# Patient Record
Sex: FEMALE | Race: WHITE
[De-identification: names, ages, dates, MRNs, and addresses within clinical notes are randomized per-mention and may not be internally consistent; named-entity substitution may affect disease eponyms.]

---

## 2018-04-30 ENCOUNTER — HOSPITAL ENCOUNTER (EMERGENCY)
Dept: HOSPITAL 63 - ER | Age: 23
Discharge: HOME | End: 2018-04-30
Payer: OTHER GOVERNMENT

## 2018-04-30 VITALS — HEIGHT: 62 IN | BODY MASS INDEX: 18.03 KG/M2 | WEIGHT: 98 LBS

## 2018-04-30 VITALS — DIASTOLIC BLOOD PRESSURE: 74 MMHG | SYSTOLIC BLOOD PRESSURE: 120 MMHG

## 2018-04-30 DIAGNOSIS — K52.9: Primary | ICD-10-CM

## 2018-04-30 LAB
ALBUMIN SERPL-MCNC: 3.7 G/DL (ref 3.4–5)
ALBUMIN/GLOB SERPL: 1.1 {RATIO} (ref 1–1.7)
ALP SERPL-CCNC: 50 U/L (ref 46–116)
ALT SERPL-CCNC: 19 U/L (ref 14–59)
ANION GAP SERPL CALC-SCNC: 10 MMOL/L (ref 6–14)
APTT PPP: YELLOW S
AST SERPL-CCNC: 17 U/L (ref 15–37)
BACTERIA #/AREA URNS HPF: 0 /HPF
BASOPHILS # BLD AUTO: 0 X10^3/UL (ref 0–0.2)
BASOPHILS NFR BLD: 1 % (ref 0–3)
BILIRUB SERPL-MCNC: 0.5 MG/DL (ref 0.2–1)
BILIRUB UR QL STRIP: (no result)
BUN/CREAT SERPL: 20 (ref 6–20)
CA-I SERPL ISE-MCNC: 14 MG/DL (ref 7–20)
CALCIUM SERPL-MCNC: 8.7 MG/DL (ref 8.5–10.1)
CHLORIDE SERPL-SCNC: 105 MMOL/L (ref 98–107)
CO2 SERPL-SCNC: 25 MMOL/L (ref 21–32)
CREAT SERPL-MCNC: 0.7 MG/DL (ref 0.6–1)
EOSINOPHIL NFR BLD: 0 X10^3/UL (ref 0–0.7)
EOSINOPHIL NFR BLD: 1 % (ref 0–3)
ERYTHROCYTE [DISTWIDTH] IN BLOOD BY AUTOMATED COUNT: 13.1 % (ref 11.5–14.5)
FIBRINOGEN PPP-MCNC: CLEAR MG/DL
GFR SERPLBLD BASED ON 1.73 SQ M-ARVRAT: 105.6 ML/MIN
GLOBULIN SER-MCNC: 3.5 G/DL (ref 2.2–3.8)
GLUCOSE SERPL-MCNC: 89 MG/DL (ref 70–99)
GLUCOSE UR STRIP-MCNC: (no result) MG/DL
HCT VFR BLD CALC: 40.5 % (ref 36–47)
HGB BLD-MCNC: 13.8 G/DL (ref 12–15.5)
LIPASE: 83 U/L (ref 73–393)
LYMPHOCYTES # BLD: 1.5 X10^3/UL (ref 1–4.8)
LYMPHOCYTES NFR BLD AUTO: 20 % (ref 24–48)
MAGNESIUM SERPL-MCNC: 2.2 MG/DL (ref 1.8–2.4)
MCH RBC QN AUTO: 31 PG (ref 25–35)
MCHC RBC AUTO-ENTMCNC: 34 G/DL (ref 31–37)
MCV RBC AUTO: 91 FL (ref 79–100)
MONO #: 0.6 X10^3/UL (ref 0–1.1)
MONOCYTES NFR BLD: 9 % (ref 0–9)
NEUT #: 5.4 X10^3UL (ref 1.8–7.7)
NEUTROPHILS NFR BLD AUTO: 71 % (ref 31–73)
NITRITE UR QL STRIP: (no result)
PLATELET # BLD AUTO: 241 X10^3/UL (ref 140–400)
POTASSIUM SERPL-SCNC: 3.9 MMOL/L (ref 3.5–5.1)
PROT SERPL-MCNC: 7.2 G/DL (ref 6.4–8.2)
RBC # BLD AUTO: 4.44 X10^6/UL (ref 3.5–5.4)
RBC #/AREA URNS HPF: 0 /HPF (ref 0–2)
SODIUM SERPL-SCNC: 140 MMOL/L (ref 136–145)
SP GR UR STRIP: >=1.03
SQUAMOUS #/AREA URNS LPF: (no result) /LPF
UROBILINOGEN UR-MCNC: 0.2 MG/DL
WBC # BLD AUTO: 7.6 X10^3/UL (ref 4–11)
WBC #/AREA URNS HPF: 0 /HPF (ref 0–4)

## 2018-04-30 PROCEDURE — 83735 ASSAY OF MAGNESIUM: CPT

## 2018-04-30 PROCEDURE — 99284 EMERGENCY DEPT VISIT MOD MDM: CPT

## 2018-04-30 PROCEDURE — 85025 COMPLETE CBC W/AUTO DIFF WBC: CPT

## 2018-04-30 PROCEDURE — 80053 COMPREHEN METABOLIC PANEL: CPT

## 2018-04-30 PROCEDURE — 36415 COLL VENOUS BLD VENIPUNCTURE: CPT

## 2018-04-30 PROCEDURE — 81001 URINALYSIS AUTO W/SCOPE: CPT

## 2018-04-30 PROCEDURE — 83690 ASSAY OF LIPASE: CPT

## 2018-04-30 NOTE — PHYS DOC
Adult General


Chief Complaint


Chief Complaint:  ABDOMINAL PAIN





HPI


HPI





Patient is a 22 year old F who presents with upper abdominal pain since she at 

[employer] last night.  She also describes associated nausea and diarrhea.  She 

is currently in the process of being worked up for GI issues. She states that 

she has infrequent bowel movements and a sensation of fullness in her abdomen. 

She did have tests pending for H. pylori. She has no other symptoms at this 

time. She has no other exacerbating or alleviating factors.





Review of Systems


Review of Systems





Constitutional: Denies fever or chills []


Eyes: Denies change in visual acuity, redness, or eye pain []


HENT: Denies nasal congestion or sore throat []


Respiratory: Denies cough or shortness of breath []


Cardiovascular: No additional information not addressed in HPI []


GI: Negative except history of present illness


: Denies dysuria or hematuria []


Musculoskeletal: Denies back pain or joint pain []


Integument: Denies rash or skin lesions []


Neurologic: Denies headache, focal weakness or sensory changes []


Endocrine: Denies polyuria or polydipsia []





All other systems were reviewed and found to be within normal limits, except as 

documented in this note.





Family History


Family History


No pertinent family medical history was reported





Current Medications


Current Medications


Current medications reviewed





Allergies


Allergies


No known allergies





Physical Exam


Physical Exam





Constitutional: Well developed, well nourished, no acute distress, non-toxic 

appearance. []


HENT: Normocephalic, atraumatic


Eyes: EOMI, conjunctiva normal, no discharge. [] 


Neck: Normal range of motion, no tenderness, supple, no stridor. [] 


Cardiovascular:Heart rate regular rhythm, no murmur []


Lungs & Thorax:  Bilateral breath sounds clear to auscultation []


Abdomen: Bowel sounds normal, soft, no masses, no pulsatile masses. [] Mild 

epigastric tenderness to palpation


Skin: Warm, dry, no erythema, no rash. [] 


Extremities: No tenderness, no cyanosis, no clubbing, ROM intact, no edema. [] 


Neurologic: Alert and oriented X 3, normal motor function, normal sensory 

function, no focal deficits noted. []


Psychologic: Affect normal, judgement normal, mood normal. []





Current Patient Data


Vital Signs





Vital Signs








  Date Time  Temp Pulse Resp B/P (MAP) Pulse Ox O2 Delivery O2 Flow Rate FiO2


 


4/30/18 13:23  94 16 109/71 (84) 97 Room Air  


 


4/30/18 12:45 98.3 99 20  100 Room Air  








Lab Results





Laboratory Tests








Test


  4/30/18


13:52 4/30/18


14:03


 


White Blood Count


  7.6 x10^3/uL


(4.0-11.0) 


 


 


Red Blood Count


  4.44 x10^6/uL


(3.50-5.40) 


 


 


Hemoglobin


  13.8 g/dL


(12.0-15.5) 


 


 


Hematocrit


  40.5 %


(36.0-47.0) 


 


 


Mean Corpuscular Volume 91 fL ()  


 


Mean Corpuscular Hemoglobin 31 pg (25-35)  


 


Mean Corpuscular Hemoglobin


Concent 34 g/dL


(31-37) 


 


 


Red Cell Distribution Width


  13.1 %


(11.5-14.5) 


 


 


Platelet Count


  241 x10^3/uL


(140-400) 


 


 


Neutrophils (%) (Auto) 71 % (31-73)  


 


Lymphocytes (%) (Auto) 20 % (24-48)  


 


Monocytes (%) (Auto) 9 % (0-9)  


 


Eosinophils (%) (Auto) 1 % (0-3)  


 


Basophils (%) (Auto) 1 % (0-3)  


 


Neutrophils # (Auto)


  5.4 x10^3uL


(1.8-7.7) 


 


 


Lymphocytes # (Auto)


  1.5 x10^3/uL


(1.0-4.8) 


 


 


Monocytes # (Auto)


  0.6 x10^3/uL


(0.0-1.1) 


 


 


Eosinophils # (Auto)


  0.0 x10^3/uL


(0.0-0.7) 


 


 


Basophils # (Auto)


  0.0 x10^3/uL


(0.0-0.2) 


 


 


Sodium Level


  140 mmol/L


(136-145) 


 


 


Potassium Level


  3.9 mmol/L


(3.5-5.1) 


 


 


Chloride Level


  105 mmol/L


() 


 


 


Carbon Dioxide Level


  25 mmol/L


(21-32) 


 


 


Anion Gap 10 (6-14)  


 


Blood Urea Nitrogen


  14 mg/dL


(7-20) 


 


 


Creatinine


  0.7 mg/dL


(0.6-1.0) 


 


 


Estimated GFR


(Cockcroft-Gault) 105.6 


  


 


 


BUN/Creatinine Ratio 20 (6-20)  


 


Glucose Level


  89 mg/dL


(70-99) 


 


 


Calcium Level


  8.7 mg/dL


(8.5-10.1) 


 


 


Magnesium Level


  2.2 mg/dL


(1.8-2.4) 


 


 


Total Bilirubin


  0.5 mg/dL


(0.2-1.0) 


 


 


Aspartate Amino Transf


(AST/SGOT) 17 U/L (15-37) 


  


 


 


Alanine Aminotransferase


(ALT/SGPT) 19 U/L (14-59) 


  


 


 


Alkaline Phosphatase


  50 U/L


() 


 


 


Total Protein


  7.2 g/dL


(6.4-8.2) 


 


 


Albumin


  3.7 g/dL


(3.4-5.0) 


 


 


Albumin/Globulin Ratio 1.1 (1.0-1.7)  


 


Lipase


  83 U/L


() 


 


 


Urine Collection Type  Unknown 


 


Urine Color  Yellow 


 


Urine Clarity  Clear 


 


Urine pH  6.0 


 


Urine Specific Gravity  >=1.030 


 


Urine Protein


  


  Trace


(NEG-TRACE)


 


Urine Glucose (UA)


  


  Neg mg/dL


(NEG)


 


Urine Ketones (Stick)


  


  Neg mg/dL


(NEG)


 


Urine Blood  Neg (NEG) 


 


Urine Nitrite  Neg (NEG) 


 


Urine Bilirubin  Neg (NEG) 


 


Urine Urobilinogen Dipstick


  


  0.2 mg/dL (0.2


mg/dL)


 


Urine Leukocyte Esterase  Neg (NEG) 


 


Urine RBC  0 /HPF (0-2) 


 


Urine WBC  0 /HPF (0-4) 


 


Urine Squamous Epithelial


Cells 


  Few /LPF 


 


 


Urine Bacteria  0 /HPF (0-FEW) 


 


Urine Mucus  Marked /LPF 











EKG


EKG


[]





Radiology/Procedures


Radiology/Procedures


[]





Course & Med Decision Making


Course & Med Decision Making


Pertinent Labs and Imaging studies reviewed. (See chart for details)





Her symptoms did improve with medication and IV fluids.





Dragon Disclaimer


Dragon Disclaimer


This electronic medical record was generated, in whole or in part, using a 

voice recognition dictation system.





Departure


Departure:


Impression:  


 Primary Impression:  


 Gastroenteritis


Disposition:  01 HOME, SELF-CARE


Condition:  STABLE


Patient Instructions:  Viral Gastroenteritis





Additional Instructions:  


Abigail was seen in the emergency department for abdominal pain, nausea and 

diarrhea. No emergency medical condition was found on history or physical exam. 

She did have normal labs. Her symptoms did improve prior to discharge. She was 

encouraged to return to the emergency room if she develops new or worsening 

symptoms. She was also advised follow-up with her primary care doctor as needed 

for further management.











SONIA ARZOLA MD Apr 30, 2018 12:48